# Patient Record
(demographics unavailable — no encounter records)

---

## 2025-04-03 NOTE — REVIEW OF SYSTEMS
[Poor Coordination] : poor coordination [Difficulty Writing] : difficulty writing [Difficulties in Speech] : speech difficulties [Dizziness] : no dizziness [Fainting] : no fainting [Tension Headache] : no tension-type headache [Difficulty Walking] : difficulty walking [Frequent Falls] : frequent falls [Joint Pain] : joint pain [Limb Pain] : limb pain [Negative] : Heme/Lymph

## 2025-04-03 NOTE — HISTORY OF PRESENT ILLNESS
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  Notes for the last year or 2, has had noticeable difficulty with gait, increasing falls, unsteadiness, but a year ago, January 2024 he fell, striking the back of the head required a visit to the local emergency room, [facility], CT scan of the head showed a traumatic subarachnoid hemorrhage, admitted for several days for further evaluation.  Patient did fine, follow-up as an outpatient with trauma surgeon for removal of stitches. He has noted increasing difficulty, patient presents with weakness history of chronic back pain, feeling of falling to the left.  Noted some clumsiness of the hands.  She also noted a change in speech, slower mumbles, symptoms difficult for the last 1 talking.  No trouble swallowing. No headache, no episode of passing out or becoming aware of surroundings, denies dizziness or lightheadedness when he stands.  No transient unilateral weakness or numbness of the arms or legs. There is a history of chronic back pain, more on the right than the left, nonradiating.  Worse when he stands or walks for a period of time.  No change in bowel or bladder habits.

## 2025-04-03 NOTE — CONSULT LETTER
[Dear  ___] : Dear  [unfilled], [Courtesy Letter:] : I had the pleasure of seeing your patient, [unfilled], in my office today. [Please see my note below.] : Please see my note below. [Consult Closing:] : Thank you very much for allowing me to participate in the care of this patient.  If you have any questions, please do not hesitate to contact me. [Sincerely,] : Sincerely, [FreeTextEntry2] : Jm Nelson MD [FreeTextEntry3] : Colton Jerez MD

## 2025-04-03 NOTE — PHYSICAL EXAM
[General Appearance - Alert] : alert [General Appearance - In No Acute Distress] : in no acute distress [Person] : oriented to person [Place] : oriented to place [Time] : oriented to time [Cranial Nerves Optic (II)] : visual acuity intact bilaterally,  visual fields full to confrontation, pupils equal round and reactive to light [Cranial Nerves Oculomotor (III)] : extraocular motion intact [Cranial Nerves Trigeminal (V)] : facial sensation intact symmetrically [Cranial Nerves Facial (VII)] : face symmetrical [Cranial Nerves Vestibulocochlear (VIII)] : hearing was intact bilaterally [Cranial Nerves Glossopharyngeal (IX)] : tongue and palate midline [Cranial Nerves Accessory (XI - Cranial And Spinal)] : head turning and shoulder shrug symmetric [Cranial Nerves Hypoglossal (XII)] : there was no tongue deviation with protrusion [Motor Strength] : muscle strength was normal in all four extremities [Involuntary Movements] : no involuntary movements were seen [Motor Handedness Right-Handed] : the patient is right hand dominant [Sensation Tactile Decrease] : light touch was intact [Sensation Pain / Temperature Decrease] : pain and temperature was intact [Sensation Vibration Decrease] : vibration was intact [Romberg's Sign] : a positive Romberg's sign was present [Limited Balance] : the patient's balance was impaired [Past-pointing] : past-pointing was present [Dysdiadochokinesia Bilaterally] : present bilaterally [Coordination - Dysmetria Impaired Finger-to-Nose Bilateral] : present bilaterally [Coordination - Dysmetria Impaired Heel-to-Shin Bilateral] : present bilaterally [General Appearance - Well Nourished] : well nourished [General Appearance - Well Developed] : well developed [Oriented To Time, Place, And Person] : oriented to person, place, and time [Impaired Insight] : insight and judgment were intact [Affect] : the affect was normal [Naming Objects] : no difficulty naming common objects [Repeating Phrases] : no difficulty repeating a phrase [Writing A Sentence] : no difficulty writing a sentence [Comprehension] : comprehension intact [Paresis Pronator Drift Right-Sided] : no pronator drift on the right [Paresis Pronator Drift Left-Sided] : no pronator drift on the left [Tremor] : no tremor present [0] : Ankle jerk right 0 [1+] : Ankle jerk left 1+ [Plantar Reflex Right Only] : normal on the right [Plantar Reflex Left Only] : normal on the left [___] : absent on the right [___] : absent on the left [FreeTextEntry4] : Speech is slow at times difficult to understand pasty, mildly slurred [FreeTextEntry6] : Increased muscle tone noted bilaterally [FreeTextEntry8] : Wide-based gait [Neck Appearance] : the appearance of the neck was normal [] : the neck was supple [Arterial Pulses Carotid] : carotid pulses were normal with no bruits [No Spinal Tenderness] : no spinal tenderness

## 2025-04-03 NOTE — DISCUSSION/SUMMARY
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  Prior history of a septal infarct, and traumatic subarachnoid hemorrhage a year ago.  Symptoms are not worsening. History and examination suggest an underlying progressive degenerative disorder of the cerebellum, questionable peripheral neuropathy because of the distal reflexes. Plan: Order MRI of the brain and cervical spine without contrast. Electromyography nerve conduction study. Series of blood work. Advised patient to obtain copies of previous imaging and studies performed at [facility]. Advised to use a cane which she has and or a walker which she has. And try physical therapy for gait and balance.